# Patient Record
Sex: FEMALE | Race: WHITE
[De-identification: names, ages, dates, MRNs, and addresses within clinical notes are randomized per-mention and may not be internally consistent; named-entity substitution may affect disease eponyms.]

---

## 2018-08-12 ENCOUNTER — HOSPITAL ENCOUNTER (INPATIENT)
Dept: HOSPITAL 12 - ER | Age: 73
LOS: 1 days | Discharge: HOME | DRG: 90 | End: 2018-08-13
Attending: INTERNAL MEDICINE
Payer: COMMERCIAL

## 2018-08-12 VITALS — HEIGHT: 66 IN | WEIGHT: 145 LBS | BODY MASS INDEX: 23.3 KG/M2

## 2018-08-12 DIAGNOSIS — M25.512: ICD-10-CM
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
## 2018-08-12 DIAGNOSIS — Z79.899: ICD-10-CM
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
## 2018-08-12 DIAGNOSIS — Z96.652: ICD-10-CM

## 2018-08-12 DIAGNOSIS — R94.31: ICD-10-CM

## 2018-08-12 DIAGNOSIS — Z96.641: ICD-10-CM

## 2018-08-12 DIAGNOSIS — E78.5: ICD-10-CM

## 2018-08-12 DIAGNOSIS — S06.0X0A: Primary | ICD-10-CM

## 2018-08-12 DIAGNOSIS — Z87.891: ICD-10-CM

## 2018-08-12 DIAGNOSIS — M85.80: ICD-10-CM

## 2018-08-12 DIAGNOSIS — M51.36: ICD-10-CM
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
## 2018-08-12 DIAGNOSIS — M50.322: ICD-10-CM

## 2018-08-12 DIAGNOSIS — I10: ICD-10-CM

## 2018-08-12 DIAGNOSIS — M51.34: ICD-10-CM

## 2018-08-12 DIAGNOSIS — R94.4: ICD-10-CM

## 2018-08-12 DIAGNOSIS — Z79.82: ICD-10-CM

## 2018-08-12 LAB
BASOPHILS # BLD AUTO: 0 K/UL (ref 0–8)
BASOPHILS NFR BLD AUTO: 0.4 % (ref 0–2)
BUN SERPL-MCNC: 20 MG/DL (ref 7–18)
CHLORIDE SERPL-SCNC: 104 MMOL/L (ref 98–107)
CO2 SERPL-SCNC: 25 MMOL/L (ref 21–32)
CREAT SERPL-MCNC: 0.9 MG/DL (ref 0.6–1.3)
EOSINOPHIL # BLD AUTO: 0.1 K/UL (ref 0–0.7)
EOSINOPHIL NFR BLD AUTO: 1.6 % (ref 0–7)
GLUCOSE SERPL-MCNC: 116 MG/DL (ref 74–106)
HCT VFR BLD AUTO: 39.8 % (ref 31.2–41.9)
HGB BLD-MCNC: 13.4 G/DL (ref 10.9–14.3)
LYMPHOCYTES # BLD AUTO: 1.1 K/UL (ref 20–40)
LYMPHOCYTES NFR BLD AUTO: 15.2 % (ref 20.5–51.5)
MCH RBC QN AUTO: 32.1 UUG (ref 24.7–32.8)
MCHC RBC AUTO-ENTMCNC: 34 G/DL (ref 32.3–35.6)
MCV RBC AUTO: 95.5 FL (ref 75.5–95.3)
MONOCYTES # BLD AUTO: 0.8 K/UL (ref 2–10)
MONOCYTES NFR BLD AUTO: 12 % (ref 0–11)
NEUTROPHILS # BLD AUTO: 4.9 K/UL (ref 1.8–8.9)
NEUTROPHILS NFR BLD AUTO: 70.8 % (ref 38.5–71.5)
PLATELET # BLD AUTO: 249 K/UL (ref 179–408)
POTASSIUM SERPL-SCNC: 4.4 MMOL/L (ref 3.5–5.1)
RBC # BLD AUTO: 4.17 MIL/UL (ref 3.63–4.92)
WBC # BLD AUTO: 7 K/UL (ref 3.8–11.8)

## 2018-08-12 PROCEDURE — A4663 DIALYSIS BLOOD PRESSURE CUFF: HCPCS

## 2018-08-13 VITALS — SYSTOLIC BLOOD PRESSURE: 115 MMHG | DIASTOLIC BLOOD PRESSURE: 82 MMHG

## 2018-08-13 VITALS — SYSTOLIC BLOOD PRESSURE: 117 MMHG | DIASTOLIC BLOOD PRESSURE: 76 MMHG

## 2018-08-13 VITALS — DIASTOLIC BLOOD PRESSURE: 76 MMHG | SYSTOLIC BLOOD PRESSURE: 133 MMHG

## 2018-08-15 ENCOUNTER — HOSPITAL ENCOUNTER (INPATIENT)
Dept: HOSPITAL 12 - ER | Age: 73
LOS: 2 days | Discharge: HOME | DRG: 74 | End: 2018-08-17
Payer: COMMERCIAL

## 2018-08-15 VITALS — DIASTOLIC BLOOD PRESSURE: 53 MMHG | SYSTOLIC BLOOD PRESSURE: 105 MMHG

## 2018-08-15 VITALS — SYSTOLIC BLOOD PRESSURE: 102 MMHG | DIASTOLIC BLOOD PRESSURE: 71 MMHG

## 2018-08-15 VITALS — WEIGHT: 157.31 LBS | BODY MASS INDEX: 25.28 KG/M2 | HEIGHT: 66 IN

## 2018-08-15 VITALS — DIASTOLIC BLOOD PRESSURE: 58 MMHG | SYSTOLIC BLOOD PRESSURE: 94 MMHG

## 2018-08-15 DIAGNOSIS — F07.81: ICD-10-CM
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
## 2018-08-15 DIAGNOSIS — R79.89: ICD-10-CM

## 2018-08-15 DIAGNOSIS — E78.5: ICD-10-CM

## 2018-08-15 DIAGNOSIS — Z96.652: ICD-10-CM

## 2018-08-15 DIAGNOSIS — Z96.641: ICD-10-CM

## 2018-08-15 DIAGNOSIS — I67.2: ICD-10-CM

## 2018-08-15 DIAGNOSIS — H81.10: ICD-10-CM

## 2018-08-15 DIAGNOSIS — I10: ICD-10-CM

## 2018-08-15 DIAGNOSIS — Z79.899: ICD-10-CM

## 2018-08-15 DIAGNOSIS — Z79.82: ICD-10-CM

## 2018-08-15 DIAGNOSIS — S20.412D: ICD-10-CM

## 2018-08-15 DIAGNOSIS — N32.81: ICD-10-CM

## 2018-08-15 DIAGNOSIS — G47.00: ICD-10-CM

## 2018-08-15 DIAGNOSIS — S09.90XS: ICD-10-CM

## 2018-08-15 DIAGNOSIS — G90.8: Primary | ICD-10-CM

## 2018-08-15 DIAGNOSIS — M19.90: ICD-10-CM
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
## 2018-08-15 LAB
ALP SERPL-CCNC: 89 U/L (ref 50–136)
ALT SERPL W/O P-5'-P-CCNC: 27 U/L (ref 14–59)
AST SERPL-CCNC: 18 U/L (ref 15–37)
BASOPHILS # BLD AUTO: 0 K/UL (ref 0–8)
BASOPHILS NFR BLD AUTO: 0.7 % (ref 0–2)
BILIRUB DIRECT SERPL-MCNC: 0.2 MG/DL (ref 0–0.2)
BILIRUB SERPL-MCNC: 0.8 MG/DL (ref 0.2–1)
BUN SERPL-MCNC: 21 MG/DL (ref 7–18)
CHLORIDE SERPL-SCNC: 107 MMOL/L (ref 98–107)
CO2 SERPL-SCNC: 19 MMOL/L (ref 21–32)
CREAT SERPL-MCNC: 0.9 MG/DL (ref 0.6–1.3)
EOSINOPHIL # BLD AUTO: 0.2 K/UL (ref 0–0.7)
EOSINOPHIL NFR BLD AUTO: 2.5 % (ref 0–7)
GLUCOSE SERPL-MCNC: 95 MG/DL (ref 74–106)
HCT VFR BLD AUTO: 36.2 % (ref 31.2–41.9)
HGB BLD-MCNC: 12.5 G/DL (ref 10.9–14.3)
LYMPHOCYTES # BLD AUTO: 1.4 K/UL (ref 20–40)
LYMPHOCYTES NFR BLD AUTO: 21.5 % (ref 20.5–51.5)
MCH RBC QN AUTO: 33.4 UUG (ref 24.7–32.8)
MCHC RBC AUTO-ENTMCNC: 35 G/DL (ref 32.3–35.6)
MCV RBC AUTO: 96.6 FL (ref 75.5–95.3)
MONOCYTES # BLD AUTO: 0.6 K/UL (ref 2–10)
MONOCYTES NFR BLD AUTO: 8.9 % (ref 0–11)
NEUTROPHILS # BLD AUTO: 4.2 K/UL (ref 1.8–8.9)
NEUTROPHILS NFR BLD AUTO: 66.4 % (ref 38.5–71.5)
PLATELET # BLD AUTO: 258 K/UL (ref 179–408)
POTASSIUM SERPL-SCNC: 3.9 MMOL/L (ref 3.5–5.1)
RBC # BLD AUTO: 3.74 MIL/UL (ref 3.63–4.92)
WBC # BLD AUTO: 6.4 K/UL (ref 3.8–11.8)
WS STN SPEC: 7.2 G/DL (ref 6.4–8.2)

## 2018-08-15 PROCEDURE — A4663 DIALYSIS BLOOD PRESSURE CUFF: HCPCS

## 2018-08-15 RX ADMIN — ATORVASTATIN CALCIUM SCH MG: 10 TABLET, FILM COATED ORAL at 21:34

## 2018-08-15 RX ADMIN — SILVER SULFADIAZINE SCH GM: 10 CREAM TOPICAL at 22:51

## 2018-08-15 RX ADMIN — Medication SCH MG: at 15:23

## 2018-08-15 RX ADMIN — SODIUM CHLORIDE PRN MLS/HR: 0.9 INJECTION, SOLUTION INTRAVENOUS at 16:00

## 2018-08-15 RX ADMIN — Medication SCH MG: at 21:34

## 2018-08-15 RX ADMIN — GABAPENTIN SCH MG: 300 CAPSULE ORAL at 17:40

## 2018-08-15 NOTE — NUR
PATIENT STATES NAUSEA HAS DIMINISHED.  PATIENT AWARE OF PENDING ADMISSION.  
REPORT GIVEN TO LIA CHRISTENSEN.

## 2018-08-15 NOTE — NUR
PATIENT WAS SEEN BY MD FOR C/O WOUND ON HER UPPER BACK AND PAIN.  STATES SHE 
FELL COUPLE DAYS AGO.  WOUND CLEANSED AND DRESSED.. SILVADENE CREAM APPLIED AS 
ORDERED BY MD.

## 2018-08-15 NOTE — NUR
RECEIVED SHIFT REPORT FROM DAY SHIFT RN. PT IN BED. NO PAIN, C/P, SOB, N/V. IVF NS INFUSING 
AT 75 CC/HR VIA 22W G R FOREARM. BED IN LOW AND LOCKED POSITION WITH BILATERAL UPPER 
SIDERAILS UP. CALL LIGHT WITHIN REACH. FRIEND AT BEDSIDE.

## 2018-08-15 NOTE — NUR
Patient transferred from ER to med-surg floor at this time in stable condition, no s/s of 
distress. Vital signs within normal limits. fall risk. bed alarm on. id band placed. no 
complaints of pain verbalized by patient at this time.

Patient does complain of vertigo with sudden head movements. a/ox4, guarded. weak gait. PT 
consult placed. pictures taken of wounds, placed in chart. wound care consult ordered. will 
continue to monitor throughout shift and follow admitting orders.

## 2018-08-15 NOTE — NUR
No significant changes from time of admission. vital signs wnl. ivf running. no signs of 
distress. silvadene cream ordered for wound care. tolerating diet very well. bed alarm on, 
call light within reach of patient.

## 2018-08-15 NOTE — NUR
PT C/O ACUTE R FEMORAL PAIN. UPON ASSESSMENT, NEGATIVE NAKUL'S SIGN. PT C/O AGGRAVATING PAIN 
WHEN COUNTER PRESSURE IS APPLIED. PIERRE MONTE, NOTIFIED, RECEIVED ORDER FOR NORCO 5/325 MG PRN 
AND XRAY OF R FEMUR. ORDERS NOTED AND CARRIED OUT. WILL CONTINUE TO MONITOR.

## 2018-08-16 VITALS — SYSTOLIC BLOOD PRESSURE: 109 MMHG | DIASTOLIC BLOOD PRESSURE: 72 MMHG

## 2018-08-16 VITALS — DIASTOLIC BLOOD PRESSURE: 72 MMHG | SYSTOLIC BLOOD PRESSURE: 121 MMHG

## 2018-08-16 VITALS — DIASTOLIC BLOOD PRESSURE: 65 MMHG | SYSTOLIC BLOOD PRESSURE: 104 MMHG

## 2018-08-16 VITALS — DIASTOLIC BLOOD PRESSURE: 71 MMHG | SYSTOLIC BLOOD PRESSURE: 118 MMHG

## 2018-08-16 LAB
BASOPHILS # BLD AUTO: 0 K/UL (ref 0–8)
BASOPHILS NFR BLD AUTO: 0.4 % (ref 0–2)
BUN SERPL-MCNC: 15 MG/DL (ref 7–18)
CHLORIDE SERPL-SCNC: 111 MMOL/L (ref 98–107)
CHOLEST SERPL-MCNC: 157 MG/DL (ref ?–200)
CO2 SERPL-SCNC: 24 MMOL/L (ref 21–32)
CREAT SERPL-MCNC: 0.9 MG/DL (ref 0.6–1.3)
EOSINOPHIL # BLD AUTO: 0.2 K/UL (ref 0–0.7)
EOSINOPHIL NFR BLD AUTO: 3.6 % (ref 0–7)
GLUCOSE SERPL-MCNC: 100 MG/DL (ref 74–106)
HCT VFR BLD AUTO: 32.3 % (ref 31.2–41.9)
HDLC SERPL-MCNC: 48 MG/DL (ref 40–60)
HGB BLD-MCNC: 11 G/DL (ref 10.9–14.3)
LYMPHOCYTES # BLD AUTO: 1.3 K/UL (ref 20–40)
LYMPHOCYTES NFR BLD AUTO: 25 % (ref 20.5–51.5)
MAGNESIUM SERPL-MCNC: 2 MG/DL (ref 1.8–2.4)
MCH RBC QN AUTO: 32.7 UUG (ref 24.7–32.8)
MCHC RBC AUTO-ENTMCNC: 34 G/DL (ref 32.3–35.6)
MCV RBC AUTO: 95.7 FL (ref 75.5–95.3)
MONOCYTES # BLD AUTO: 0.7 K/UL (ref 2–10)
MONOCYTES NFR BLD AUTO: 13.6 % (ref 0–11)
NEUTROPHILS # BLD AUTO: 2.9 K/UL (ref 1.8–8.9)
NEUTROPHILS NFR BLD AUTO: 57.4 % (ref 38.5–71.5)
PHOSPHATE SERPL-MCNC: 2.8 MG/DL (ref 2.5–4.9)
PLATELET # BLD AUTO: 239 K/UL (ref 179–408)
POTASSIUM SERPL-SCNC: 4.1 MMOL/L (ref 3.5–5.1)
RBC # BLD AUTO: 3.37 MIL/UL (ref 3.63–4.92)
TRIGL SERPL-MCNC: 146 MG/DL (ref 30–150)
WBC # BLD AUTO: 5 K/UL (ref 3.8–11.8)

## 2018-08-16 RX ADMIN — SODIUM CHLORIDE PRN MLS/HR: 0.9 INJECTION, SOLUTION INTRAVENOUS at 05:57

## 2018-08-16 RX ADMIN — Medication SCH MG: at 05:53

## 2018-08-16 RX ADMIN — GABAPENTIN SCH MG: 300 CAPSULE ORAL at 13:47

## 2018-08-16 RX ADMIN — SODIUM CHLORIDE PRN MLS/HR: 0.9 INJECTION, SOLUTION INTRAVENOUS at 20:53

## 2018-08-16 RX ADMIN — Medication SCH MG: at 13:47

## 2018-08-16 RX ADMIN — PANTOPRAZOLE SODIUM SCH MG: 40 TABLET, DELAYED RELEASE ORAL at 06:00

## 2018-08-16 RX ADMIN — SILVER SULFADIAZINE SCH GM: 10 CREAM TOPICAL at 09:53

## 2018-08-16 RX ADMIN — DIAZEPAM SCH MG: 2 TABLET ORAL at 15:42

## 2018-08-16 RX ADMIN — GABAPENTIN SCH MG: 300 CAPSULE ORAL at 18:05

## 2018-08-16 RX ADMIN — OXYBUTYNIN CHLORIDE SCH MG: 5 TABLET, EXTENDED RELEASE ORAL at 08:05

## 2018-08-16 RX ADMIN — BENAZEPRIL HYDROCHLORIDE SCH MG: 20 TABLET, FILM COATED ORAL at 08:06

## 2018-08-16 RX ADMIN — ATORVASTATIN CALCIUM SCH MG: 10 TABLET, FILM COATED ORAL at 20:48

## 2018-08-16 RX ADMIN — GABAPENTIN SCH MG: 300 CAPSULE ORAL at 08:05

## 2018-08-16 RX ADMIN — Medication SCH MG: at 21:02

## 2018-08-16 RX ADMIN — ASPIRIN SCH MG: 81 TABLET, COATED ORAL at 08:05

## 2018-08-16 NOTE — NUR
patient resting comfortably in bed at this time. complaining of right leg pain. XR right 
femur ordered by MD. bedside commode at bedside. a/ox4. stable condition, no signs of 
distress. complains of mild vertigo upon arising from bed. bed alarm is on, call light 
within reach of patient. will monitor throughout shift.

## 2018-08-16 NOTE — NUR
patient resting comfortably in bed at this time. elevated bp - will notify on call MD. 
refusing BP meds per night shift nurse. blood sugar 81 in the morning. will monitor BS 
closely throughout shift. no signs of distress at this time. bed alarm is on, call light 
within reach of patient. bed in locked/low position. side rails up x2. 

-------------------------------------------------------------------------------

Addendum: 08/16/18 at 0746 by EB TRONCOSO RN

-------------------------------------------------------------------------------

incorrect patient. please disregard.

## 2018-08-16 NOTE — NUR
RECEIVED SHIFT REPORT FROM AMPARO LYN. PT RESTING COMFORTABLY IN BED. DENIES PAIN, C/P, SOB, 
N/V. IVF NS INFUSING AT 75 CC/HR VIA 22G R FOREARM. BED IN LOW AND LOCKED POSITION WITH 
BILATERAL UPPER SIDERAILS UP. CALL LIGHT WITHIN REACH. WILL CONTINUE TO MONITOR.

## 2018-08-17 VITALS — SYSTOLIC BLOOD PRESSURE: 143 MMHG | DIASTOLIC BLOOD PRESSURE: 86 MMHG

## 2018-08-17 VITALS — SYSTOLIC BLOOD PRESSURE: 101 MMHG | DIASTOLIC BLOOD PRESSURE: 52 MMHG

## 2018-08-17 VITALS — SYSTOLIC BLOOD PRESSURE: 118 MMHG | DIASTOLIC BLOOD PRESSURE: 81 MMHG

## 2018-08-17 RX ADMIN — GABAPENTIN SCH MG: 300 CAPSULE ORAL at 13:07

## 2018-08-17 RX ADMIN — PANTOPRAZOLE SODIUM SCH MG: 40 TABLET, DELAYED RELEASE ORAL at 06:26

## 2018-08-17 RX ADMIN — GABAPENTIN SCH MG: 300 CAPSULE ORAL at 16:22

## 2018-08-17 RX ADMIN — SILVER SULFADIAZINE SCH GM: 10 CREAM TOPICAL at 08:55

## 2018-08-17 RX ADMIN — GABAPENTIN SCH MG: 300 CAPSULE ORAL at 08:09

## 2018-08-17 RX ADMIN — Medication SCH MG: at 06:00

## 2018-08-17 RX ADMIN — ASPIRIN SCH MG: 81 TABLET, COATED ORAL at 08:08

## 2018-08-17 RX ADMIN — SODIUM CHLORIDE PRN MLS/HR: 0.9 INJECTION, SOLUTION INTRAVENOUS at 09:39

## 2018-08-17 RX ADMIN — BENAZEPRIL HYDROCHLORIDE SCH MG: 20 TABLET, FILM COATED ORAL at 08:09

## 2018-08-17 RX ADMIN — DIAZEPAM SCH MG: 2 TABLET ORAL at 08:10

## 2018-08-17 RX ADMIN — Medication SCH MG: at 13:57

## 2018-08-17 RX ADMIN — OXYBUTYNIN CHLORIDE SCH MG: 5 TABLET, EXTENDED RELEASE ORAL at 08:08

## 2018-08-17 NOTE — NUR
d/c orders received noted and carried out,d/c heplock per md orders.d/c instruction and 
education given to the pt,pt verbalized understanding all the instruction.pt left the 
facility via private car in stable condition

## 2018-08-17 NOTE — NUR
PT SLEPT COMFORTABLY THROUGH THE NIGHT. CURRENTLY RESTING IN BED COMFORTABLY. DENIES PAIN, 
C/P, SOB, N/V. PT'S HOME MEDS WERE FOUND AT BEDSIDE, WILL DOCUMENT AND SEND TO PHARMACY FOR 
SAFE KEEPING UNTIL D/C. BED IN LOW AND LOCKED POSITION WITH BILATERAL UPPER SIDERAILS UP. 
CALL LIGHT WITHIN REACH. IVF NS INFUSING AT 75 ML/HR VIA 22 G R HAND IV ACCESS. IV ACCESS 
INTACT AND PATENT.

## 2019-10-29 NOTE — NUR
STILL AITING FOR INSURANCE TO ANSWER SOME QUESTIONS BEFORE ADMITTING PATIENT TO 
HOSPITAL ROOM...  PATIENT IS AWAKE AND ALERT WITH NO NEW COMPLAINTS.  SHE DRANK 
SOME ORANGE JUICE · Patient complaining of severe bladder spasms and burning sensation  · Patient could not tolerate the Ji catheter anymore  Ji catheter was removed but failed voiding trial   Started on Diflucan by gyn Oncology for possible candidiasis  · Patient with severe dysuria  Likely secondary to vulvar vaginitis  Discussed with Infectious Disease  Plan is to start on antibiotic  · Patient had the Ji catheter back in    With the treatment for the wall were Thena Dora is patient is tolerating the Ji catheter

## 2022-01-27 NOTE — NUR
CALL RECEIVED FROM MARIAJOSE WHEELER/ [facility] PERTAINING TO PT STATUS FOR 
TRANSFER. CALL GIVEN TO MD GOMES FOR ADDITIONAL INFO.
DISCHARGE INSTRUCTIONS GIVEN TO PATIENT AND PATIENT STATED WILL CALL OWN DOCTOR TO SET UP 
APPOINTMENT THIS WEEK.AWAITING FOR HER RIDE AT THIS TIME
NEW ORDERS TO DISCHARGE PATIENT HOME NOTED FROM DR BRENNAN PATIENT AWARE AND STATED 
THAT HER FRIEND WILL PICK HER UP.
Nursing Note: 



Report received from ER nurse Pt is 73 y/o female. Admitted to [facility] from ER for closed 
head injury Pt awake alert oriented x 3. Respirations unlabored on room air. Lungs clear on 
auscultation bilaterally. Bowel sounds present in all 4 quadrants. Pt noted to have strong 
grasp bilaterally in upper extremities. No change in LOC. Able to move bilateral lower 
extremities but is reporting pain on Right lower extremity that has been relieved by 
medication. Skin generally intact except for wound on left shin which was photographed. Pt 
able to answer questions and make needs known as well. Pt oriented to room. Instructed to 
press call light for assistance. Pt reporting the inability to walk due to hip pain. Side 
rails up x 2. Bed in locked position and low. Call light in reach at all times. Will 
continue to monitor.
PATIENT DISCHARGED PICKED UP BY HER FRIEND HIPOLITO IN SATISFACTORY CONDITION WITH ALL OF HER 
PERSONAL BELONGINGS.PATIENT STATED WILL CALL HER OWN DOCTOR HERSELF
PATIENT IS AWAKE ALERT AND ORIENTED DENIES PAIN OR DISCOMFORTS AT THIS TIME WAITING TO KNOW 
WHEN THE DOCTOR WILL BE HERE TO SEE HER REASSURED HER THAT AS SOON AS THE DOCTOR STARTS TO 
MAKE ROUNDS I WILL LET HER KNOW. CALL LIGHTS ARE WITHIN EASY REACH AT THIS TIME AND WILL 
CONTINUE TO OBSERVE.
PATIENT NOTED TO HAVE ABRASION ON TOP PART OF HER BACK MD AWARE WITH ORDERS AND NOTED
PATIENT SEEN BY THE PHYSICAL THERAPY AND SHE WAS ABLE TO AMBULATE IN THE HALLWAY WITH [facility]IR 
ENDURANCE.
PT IN BED RESTING QUIETLY WITH EYES CLOSED. PT IS CALM AND COOPERATIVE. NO 
SIGNS OF DISTRESS WITNESSED AT THIS TIME.
PT IS IN ROOM #1B. DR RIBEIRO EVALUATED THE PT.
PT LEFT TO [facility] FOR CT OF THE HEAD AND CERVICAL SPINE VIA BLS AMBULANCE 
ACCORDING TO DR GEMA SARGENT.
PT REFUSED PAIN MEDICATION. DR RIBEIRO NOTIFIED.
PT REPORTS NEW ONSET OF RT HIP PAIN. MD GOMES MADE AWARE.
Pt. admitted to [facility], under care of Dr. LLOYD

Belongs List completed
REPORT GIVEN TO MEDSUR NURSEHAMMAD
REPORT GIVEN TO NIGHT SHIFT RN,
Transportation arranged with Rad to transport patient to [facility] Radiology Department for imaging.  Trip #[id number], ETA [id number].
XRAY TECH AT BEDSIDE FOR RT HIP SCAN
Initiate Treatment: clobetasol 0.05 % topical ointment,Apply thin layer to affected area on arms and legs twice daily for 2 weeks
Detail Level: Zone